# Patient Record
Sex: MALE | Race: BLACK OR AFRICAN AMERICAN | NOT HISPANIC OR LATINO | Employment: FULL TIME | URBAN - METROPOLITAN AREA
[De-identification: names, ages, dates, MRNs, and addresses within clinical notes are randomized per-mention and may not be internally consistent; named-entity substitution may affect disease eponyms.]

---

## 2017-09-05 ENCOUNTER — LAB VISIT (OUTPATIENT)
Dept: LAB | Facility: HOSPITAL | Age: 37
End: 2017-09-05
Attending: PHYSICIAN ASSISTANT
Payer: COMMERCIAL

## 2017-09-05 ENCOUNTER — OFFICE VISIT (OUTPATIENT)
Dept: INTERNAL MEDICINE | Facility: CLINIC | Age: 37
End: 2017-09-05
Payer: COMMERCIAL

## 2017-09-05 VITALS
HEART RATE: 73 BPM | HEIGHT: 66 IN | WEIGHT: 189.63 LBS | SYSTOLIC BLOOD PRESSURE: 130 MMHG | TEMPERATURE: 97 F | OXYGEN SATURATION: 98 % | DIASTOLIC BLOOD PRESSURE: 68 MMHG | BODY MASS INDEX: 30.47 KG/M2

## 2017-09-05 DIAGNOSIS — A08.4 VIRAL GASTROENTERITIS: Primary | ICD-10-CM

## 2017-09-05 DIAGNOSIS — A08.4 VIRAL GASTROENTERITIS: ICD-10-CM

## 2017-09-05 PROBLEM — E66.9 OBESITY, CLASS I, BMI 30.0-34.9 (SEE ACTUAL BMI): Status: ACTIVE | Noted: 2017-09-05

## 2017-09-05 LAB
ALBUMIN SERPL BCP-MCNC: 4 G/DL
ALP SERPL-CCNC: 58 U/L
ALT SERPL W/O P-5'-P-CCNC: 14 U/L
ANION GAP SERPL CALC-SCNC: 10 MMOL/L
AST SERPL-CCNC: 16 U/L
BILIRUB SERPL-MCNC: 0.4 MG/DL
BUN SERPL-MCNC: 13 MG/DL
CALCIUM SERPL-MCNC: 9.3 MG/DL
CHLORIDE SERPL-SCNC: 107 MMOL/L
CO2 SERPL-SCNC: 23 MMOL/L
CREAT SERPL-MCNC: 1.2 MG/DL
EST. GFR  (AFRICAN AMERICAN): >60 ML/MIN/1.73 M^2
EST. GFR  (NON AFRICAN AMERICAN): >60 ML/MIN/1.73 M^2
GLUCOSE SERPL-MCNC: 91 MG/DL
POTASSIUM SERPL-SCNC: 4 MMOL/L
PROT SERPL-MCNC: 8.1 G/DL
SODIUM SERPL-SCNC: 140 MMOL/L

## 2017-09-05 PROCEDURE — 99999 PR PBB SHADOW E&M-EST. PATIENT-LVL III: CPT | Mod: PBBFAC,,, | Performed by: PHYSICIAN ASSISTANT

## 2017-09-05 PROCEDURE — 3008F BODY MASS INDEX DOCD: CPT | Mod: S$GLB,,, | Performed by: PHYSICIAN ASSISTANT

## 2017-09-05 PROCEDURE — 36415 COLL VENOUS BLD VENIPUNCTURE: CPT | Mod: PO

## 2017-09-05 PROCEDURE — 80053 COMPREHEN METABOLIC PANEL: CPT | Mod: PO

## 2017-09-05 PROCEDURE — 99213 OFFICE O/P EST LOW 20 MIN: CPT | Mod: S$GLB,,, | Performed by: PHYSICIAN ASSISTANT

## 2017-09-05 RX ORDER — ONDANSETRON 4 MG/1
4 TABLET, ORALLY DISINTEGRATING ORAL EVERY 8 HOURS PRN
Qty: 12 TABLET | Refills: 2 | Status: SHIPPED | OUTPATIENT
Start: 2017-09-05

## 2017-09-05 NOTE — PROGRESS NOTES
Subjective:       Patient ID: Gus Bass Jr. is a 36 y.o.B/ male.    Chief Complaint: Annual Exam    HPI         He comes in by himself today and has the above problem.  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  He got up about 4 AM Monday morning to get ready for work and suddenly developed nausea, queasy, and vomiting.  Then about an hour later he started with diarrhea lasted about 6 or 7 episodes throughout the day Monday.  His last episode of diarrhea was around 7 PM last night and he hasn't had any vomiting since then either.  He never has had any fever or chills in the past 36 hours.  Yesterday he was anorectic but today his appetite is back to normal.  He hasn't had anything to eat yet today but he did have some liquids.  He has been home for 2 weeks and none of his 4 kids or wife have had a symptoms like what he has had.  He's had no other contact with any other individuals including coworkers since 2 weeks ago.        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    Review of Systems    Otherwise negative concerning the PULMONARY, CV, GI, and  system review.    Objective:      Physical Exam    ENT: All within normal limits.  He is well hydrated.  There is no tenting of the skin on his hands.  CHEST: Clear BS anterior to posterior.    HEART: RSR.  Pulse is 86 bpm and regular.  S1 is greater than S2.  There is no murmur or gallop.  ABDOMEN: It is dull but soft and nontender with no guarding or rebound.  Bowel sounds are normal to slightly hyperactive.  No organomegaly or mass felt palpation percussion.  His color is good with no icterus seen in the skin or the eyes.    Assessment:       1. Viral gastroenteritis        Plan:     1.  Start him on Zofran 4 mg ODT to be taken  as needed every 8 hours when necessary nausea or vomiting.  His diet should remain soft without any grease or spices in his diet.  2.  He can return to work tomorrow provided he has no more episodes of vomiting or diarrhea today.  3.  Recheck if symptoms increase or persist over the next 3 days.

## 2018-01-12 ENCOUNTER — OFFICE VISIT (OUTPATIENT)
Dept: URGENT CARE | Facility: CLINIC | Age: 38
End: 2018-01-12
Payer: COMMERCIAL

## 2018-01-12 VITALS
DIASTOLIC BLOOD PRESSURE: 72 MMHG | HEIGHT: 66 IN | SYSTOLIC BLOOD PRESSURE: 124 MMHG | HEART RATE: 91 BPM | TEMPERATURE: 100 F | BODY MASS INDEX: 29.85 KG/M2 | WEIGHT: 185.75 LBS | OXYGEN SATURATION: 99 %

## 2018-01-12 DIAGNOSIS — J03.90 TONSILLITIS: ICD-10-CM

## 2018-01-12 DIAGNOSIS — R68.89 FLU-LIKE SYMPTOMS: Primary | ICD-10-CM

## 2018-01-12 LAB
CTP QC/QA: YES
CTP QC/QA: YES
FLUAV AG NPH QL: NEGATIVE
FLUBV AG NPH QL: NEGATIVE
S PYO RRNA THROAT QL PROBE: NEGATIVE

## 2018-01-12 PROCEDURE — 99214 OFFICE O/P EST MOD 30 MIN: CPT | Mod: S$GLB,,, | Performed by: FAMILY MEDICINE

## 2018-01-12 PROCEDURE — 87081 CULTURE SCREEN ONLY: CPT

## 2018-01-12 PROCEDURE — 87880 STREP A ASSAY W/OPTIC: CPT | Mod: QW,S$GLB,, | Performed by: FAMILY MEDICINE

## 2018-01-12 PROCEDURE — 87147 CULTURE TYPE IMMUNOLOGIC: CPT

## 2018-01-12 PROCEDURE — 87804 INFLUENZA ASSAY W/OPTIC: CPT | Mod: QW,S$GLB,, | Performed by: FAMILY MEDICINE

## 2018-01-12 PROCEDURE — 99999 PR PBB SHADOW E&M-EST. PATIENT-LVL III: CPT | Mod: PBBFAC,,, | Performed by: FAMILY MEDICINE

## 2018-01-13 NOTE — PATIENT INSTRUCTIONS
1. Fluids, rest, OTC cold medications, ibuprofen or acetaminophen for sore throat or fever   2. Will contact your if test turns up abnormal

## 2018-01-13 NOTE — PROGRESS NOTES
"Subjective:       Patient ID: Gus Bass Jr. is a 37 y.o. male.    Chief Complaint: Sore Throat (exposed to flu); Cough; Nasal Congestion; Generalized Body Aches; Headache; and Fever    /72 (BP Location: Right arm, Patient Position: Sitting, BP Method: Medium (Manual))   Pulse 91   Temp (!) 100.4 °F (38 °C) (Tympanic)   Ht 5' 6.14" (1.68 m)   Wt 84.2 kg (185 lb 11.8 oz)   SpO2 99%   BMI 29.85 kg/m²     HPI  Patient presented with symptoms described in chief complaint, since last night    Review of Systems   Constitutional: Positive for activity change, chills, fatigue and fever.   HENT: Positive for congestion, postnasal drip, rhinorrhea, sinus pressure and sore throat.    Respiratory: Negative.    Cardiovascular: Negative.    Gastrointestinal: Negative.    Genitourinary: Negative.    Musculoskeletal: Positive for myalgias.   Skin: Negative.    Neurological: Positive for headaches.   Hematological: Negative.        Objective:      Physical Exam   Constitutional: He is oriented to person, place, and time. He appears well-developed and well-nourished. No distress.   HENT:   Head: Normocephalic and atraumatic.   Mouth/Throat: No oropharyngeal exudate.   Large tonsils bilateral, erythematous, no exudates   Eyes: EOM are normal. Pupils are equal, round, and reactive to light. Right eye exhibits no discharge. Left eye exhibits no discharge.   Neck: Normal range of motion. Neck supple.   Cardiovascular: Regular rhythm and normal heart sounds.    Pulmonary/Chest: Effort normal and breath sounds normal. He has no wheezes. He has no rales.   Musculoskeletal: Normal range of motion.   Lymphadenopathy:     He has no cervical adenopathy.   Neurological: He is alert and oriented to person, place, and time.   Skin: Skin is warm and dry. No rash noted. He is not diaphoretic.   Nursing note and vitals reviewed.      Assessment:       1. Flu-like symptoms    2. Tonsillitis        Plan:     Gus was seen " today for sore throat, cough, nasal congestion, generalized body aches, headache and fever.    Diagnoses and all orders for this visit:    Flu-like symptoms  -     POCT Influenza A/B (-)    Tonsillitis  -     POCT rapid strep A (-)  -     Strep A culture, throat              Discussed with pt/family all information and results pertaining to this visit. Discussed diagnosis and plan of treatment.  All questions and concerns were addressed at this time. Pt/family expresses understanding of information and instructions.  Care and follow up instruction provided.

## 2018-01-16 LAB
BACTERIA THROAT CULT: NORMAL
BACTERIA THROAT CULT: NORMAL